# Patient Record
(demographics unavailable — no encounter records)

---

## 2025-06-06 NOTE — DISCUSSION/SUMMARY
[de-identified] : This patient has severe left hip osteoarthritis. I had a discussion with the patient, who is a candidate for left total hip replacement. Risks, benefits and alternatives were discussed. At this point, the patient wants to hold off as the pain is not quite severe enough. I gave them a book on ip replacements and my contact card. If they want to schedule in the future, then they will come in and we will have a full discussion. An extensive discussion was conducted on the natural history of the disease and the variety of surgical and non-surgical options available to the patient including, but not limited to non-steroidal anti-inflammatory medications, steroid injections, physical therapy, maintenance of ideal body weight, and reduction of activity. I recommend mobic for this diagnosis but he prefers to take otc nsaids prn pain. The patient will schedule an appointment as needed.

## 2025-06-06 NOTE — PHYSICAL EXAM
[de-identified] : Well developed, well nourished in no apparent distress, awake, alert and orientated to person, place and time. Respirations are even and unlabored. Gait evaluation reveals a limp. There is no inguinal adenopathy. Examination of the contralateral hip shows normal range of motion, strength, no tenderness, and intact skin. The affected limb is well-perfused, with palpable pedal pulses and no skin lesions Sensorimotor is grossly intact. Hip motion is reduced and causes pain. FADIR is positive and CINDY is positive. Stinchfield test is positive. Leg lengths are approximately 0.5 cm short on the left. Both hips are stable and muscle strength is normal. [de-identified] :  AP pelvis, AP and lateral hip radiographs of the left hip were ordered and taken in the office and demonstrate degenerative joint disease of the hip with joint space narrowing, osteophyte formation, and subchondral sclerosis.

## 2025-06-06 NOTE — HISTORY OF PRESENT ILLNESS
[de-identified] : This is a very nice  60 year old  male experiencing  pain in the left hip and groin which is severe in intensity and has been going on for at least 1 year now.  He did try a left hip intraarticular cortisone injection which helped. The pain limits activities of daily living. Walking tolerance is  reduced.  Difficult getting into and out of a car. The patient denies any radiation of the pain to the feet and it is not associated with numbness, tingling, or weakness.

## 2025-06-06 NOTE — REVIEW OF SYSTEMS
[Joint Pain] : joint pain [Negative] : Heme/Lymph [Arthralgia] : arthralgia [Joint Stiffness] : no joint stiffness

## 2025-07-15 NOTE — PHYSICAL EXAM
[de-identified] : Well developed, well nourished in no apparent distress, awake, alert and orientated to person, place and time. Respirations are even and unlabored. Gait evaluation reveals a limp. There is no inguinal adenopathy. Examination of the contralateral hip shows normal range of motion, strength, no tenderness, and intact skin. The affected limb is well-perfused, with palpable pedal pulses and no skin lesions Sensorimotor is grossly intact. Hip motion is reduced and causes pain. FADIR is positive and CINDY is positive. Stinchfield test is positive. Leg lengths are approximately 0.5 cm short on the left. Both hips are stable and muscle strength is normal. [de-identified] :  AP pelvis, AP and lateral hip radiographs of the left hip were brought in by the patient which I reviewed and demonstrate degenerative joint disease of the hip with joint space narrowing, osteophyte formation, and subchondral sclerosis.

## 2025-07-15 NOTE — HISTORY OF PRESENT ILLNESS
[de-identified] : This is a very nice 60 year old male experiencing  pain in the left hip and groin which is severe in intensity and has been going on for at least 1 year now. Known left hip osteoarthritis.  He did try a left hip intraarticular cortisone injection which helped. The pain limits activities of daily living. Walking tolerance is  reduced.  Difficult getting into and out of a car. The patient denies any radiation of the pain to the feet and it is not associated with numbness, tingling, or weakness.

## 2025-07-15 NOTE — DISCUSSION/SUMMARY
[de-identified] : This patient has severe left hip osteoarthritis.  He has tried and failed a course of conservative management and would like to proceed with a direct anterior approach left total hip arthroplasty.  The patient is an appropriate candidate for consideration of left total hip replacement. An extensive discussion was conducted of the natural history of the disease and the variety of surgical and non-surgical treatment options available to the patient. A risk/benefit analysis was discussed with the patient reviewing the advantages and disadvantages of surgical intervention at this time. Both the level and length of the patient's pain have made additional conservative treatment measures consisting of NSAIDs, physical therapy, and/or corticosteroids contraindicated. A full explanation was given of the nature and the purpose of the procedure and anesthesia, its benefits, possible alternative methods of diagnosis or treatment, the risks involved, the possibility of complications, the foreseeable consequences of the procedure and the possible results of the non-treatment. I reviewed the plan of care as well as used a model of a total hip implant equivalent to the one that will be used for their total hip joint replacement. The ability to secure the implant utilizing cement or cementless (press-fit) was discussed with the patient. The patient agrees with the plan of care, as well as the use of implants for their total hip replacement.   No guarantee or assurance was made as to the results that may be obtained. Specifically, the risks were identified to include, but are not limited to the following: Infection, phlebitis, pulmonary embolism, death, paralysis, dislocation, pain, stiffness, instability, limp, weakness, breakage, leg-length inequality, uncontrolled bleeding, nerve injury, blood vessel injury, pressure sores, anesthetic risks, delayed healing of wound and bone, and wear and loosening. Further discussion was undertaken with the patient about the details of surgical preparation, treatment, and postoperative rehabilitation including medical clearance, autotransfusion, the hospital course, and the postoperative rehabilitation involved. All in all, I feel that this patient is a good candidate for surgical reconstruction.  The patient and I discussed the option for outpatient joint replacement. They will not stay overnight in the hospital after surgery and will discharge home on the same day of surgery. The risks and benefits of this type of rapid recovery protocol was reviewed with the patient and they are in agreement with proceeding with outpatient joint replacement surgery. They understand that in the event of an emergency, that they will be transferred to the main hospital for inpatient care.